# Patient Record
Sex: FEMALE | Race: BLACK OR AFRICAN AMERICAN | NOT HISPANIC OR LATINO | Employment: UNEMPLOYED | URBAN - METROPOLITAN AREA
[De-identification: names, ages, dates, MRNs, and addresses within clinical notes are randomized per-mention and may not be internally consistent; named-entity substitution may affect disease eponyms.]

---

## 2023-05-30 ENCOUNTER — TELEPHONE (OUTPATIENT)
Dept: ORTHOPEDICS | Facility: CLINIC | Age: 53
End: 2023-05-30

## 2023-05-30 ENCOUNTER — APPOINTMENT (OUTPATIENT)
Dept: GENERAL RADIOLOGY | Facility: CLINIC | Age: 53
End: 2023-05-30
Attending: EMERGENCY MEDICINE
Payer: COMMERCIAL

## 2023-05-30 ENCOUNTER — HOSPITAL ENCOUNTER (EMERGENCY)
Facility: CLINIC | Age: 53
Discharge: HOME | End: 2023-05-30
Attending: EMERGENCY MEDICINE | Admitting: EMERGENCY MEDICINE
Payer: COMMERCIAL

## 2023-05-30 VITALS
TEMPERATURE: 98.3 F | SYSTOLIC BLOOD PRESSURE: 121 MMHG | HEART RATE: 61 BPM | DIASTOLIC BLOOD PRESSURE: 72 MMHG | BODY MASS INDEX: 38.57 KG/M2 | HEIGHT: 66 IN | OXYGEN SATURATION: 99 % | WEIGHT: 240 LBS | RESPIRATION RATE: 15 BRPM

## 2023-05-30 DIAGNOSIS — S99.192A CLOSED FRACTURE OF BASE OF FIFTH METATARSAL BONE OF LEFT FOOT AT METAPHYSEAL-DIAPHYSEAL JUNCTION, INITIAL ENCOUNTER: ICD-10-CM

## 2023-05-30 DIAGNOSIS — S82.62XA CLOSED FRACTURE OF DISTAL LATERAL MALLEOLUS OF LEFT FIBULA, INITIAL ENCOUNTER: ICD-10-CM

## 2023-05-30 PROCEDURE — 99284 EMERGENCY DEPT VISIT MOD MDM: CPT | Performed by: EMERGENCY MEDICINE

## 2023-05-30 PROCEDURE — 99284 EMERGENCY DEPT VISIT MOD MDM: CPT | Mod: 25 | Performed by: EMERGENCY MEDICINE

## 2023-05-30 PROCEDURE — 73630 X-RAY EXAM OF FOOT: CPT | Mod: LT

## 2023-05-30 PROCEDURE — 27786 TREATMENT OF ANKLE FRACTURE: CPT | Mod: LT | Performed by: EMERGENCY MEDICINE

## 2023-05-30 PROCEDURE — 28470 CLTX METATARSAL FX WO MNP EA: CPT | Mod: LT,59 | Performed by: EMERGENCY MEDICINE

## 2023-05-30 PROCEDURE — 73600 X-RAY EXAM OF ANKLE: CPT | Mod: 26 | Performed by: RADIOLOGY

## 2023-05-30 PROCEDURE — 28470 CLTX METATARSAL FX WO MNP EA: CPT | Mod: LT | Performed by: EMERGENCY MEDICINE

## 2023-05-30 PROCEDURE — 250N000013 HC RX MED GY IP 250 OP 250 PS 637: Performed by: EMERGENCY MEDICINE

## 2023-05-30 PROCEDURE — 73630 X-RAY EXAM OF FOOT: CPT | Mod: 26 | Performed by: RADIOLOGY

## 2023-05-30 PROCEDURE — 73600 X-RAY EXAM OF ANKLE: CPT | Mod: LT

## 2023-05-30 RX ORDER — OXYCODONE HYDROCHLORIDE 5 MG/1
5 TABLET ORAL EVERY 6 HOURS PRN
Qty: 6 TABLET | Refills: 0 | Status: SHIPPED | OUTPATIENT
Start: 2023-05-30 | End: 2023-06-02

## 2023-05-30 RX ORDER — IBUPROFEN 600 MG/1
600 TABLET, FILM COATED ORAL ONCE
Status: COMPLETED | OUTPATIENT
Start: 2023-05-30 | End: 2023-05-30

## 2023-05-30 RX ADMIN — IBUPROFEN 600 MG: 600 TABLET, FILM COATED ORAL at 02:05

## 2023-05-30 ASSESSMENT — ACTIVITIES OF DAILY LIVING (ADL): ADLS_ACUITY_SCORE: 35

## 2023-05-30 NOTE — DISCHARGE INSTRUCTIONS
Please make an appointment to follow up with Orthopedics Clinic (phone: [phone]) as soon as possible.    Keep your foot elevated, place ice over your foot and use Tylenol and ibuprofen as below    If Samira has discomfort from fever or other pain, she can have:  Acetaminophen (Tylenol) every 6 hours as needed. Her dose is:    2 regular strength tabs (650 mg)                                     (43.2+ kg/96+ lb)    NOTE: If your acetaminophen (Tylenol) came with a dropper marked with 0.4 and 0.8 ml, call us ([phone]) or check with your doctor about the dose before using it.     AND/OR      Ibuprofen (Advil, Motrin) every 6 hours as needed. His/her dose is:    2 regular strength tabs (400 mg)                                                                         (40-60 kg/ lb)

## 2023-05-30 NOTE — ED PROVIDER NOTES
"ED Provider Note  [facility]      History     Chief Complaint   Patient presents with     Ankle Pain     Per pt, \"was walking out of my daughter's house & I tripped on steps & fell onto my left ankle & it's hurt ever since.\" Occurred this past Friday. Rates pain 10/10. Pt took a friend's muscle relaxer before coming tonight.      HPI  Samira Duron is a 52 year old female who presents with left foot pain. The patient reports that she fell on stairs 5/26/23, landing on her leg and hurting her ankle. She notes taking a muscle relaxer from a friend for her pain, she is not sure what it was. She states that she has been walking on it with difficulty due to severe pain. She requests an ace wrap at this time.    Past Medical History  History reviewed. No pertinent past medical history.  History reviewed. No pertinent surgical history.  naloxone (NARCAN) 4 MG/0.1ML nasal spray  oxyCODONE (ROXICODONE) 5 MG tablet      No Known Allergies  Family History  History reviewed. No pertinent family history.  Social History   Social History     Tobacco Use     Smoking status: Never     Smokeless tobacco: Never   Substance Use Topics     Alcohol use: Yes     Comment: 1 beer a day     Drug use: Never         A medically appropriate review of systems was performed with pertinent positives and negatives noted in the HPI, and all other systems negative.    Physical Exam      Physical Exam  Physical Exam   Constitutional: oriented to person, place, and time. appears well-developed and well-nourished.   HENT:   Head: Normocephalic and atraumatic.   Neck: Normal range of motion.   Pulmonary/Chest: Effort normal. No respiratory distress.   Cardiac: No murmurs, rubs, gallops. RRR.  Abdominal: Abdomen soft, nontender, nondistended. No rebound tenderness.  MSK: Long bones without deformity or evidence of trauma through palpation at the base of the left ankle and the distal lateral malleolus, range of motion ankle " normal without swelling  Neurological: alert and oriented to person, place, and time.   Skin: Skin is warm and dry.   Psychiatric:  normal mood and affect.  behavior is normal. Thought content normal.       ED Course, Procedures, & Data      [facility]    Splint Application    Date/Time: 5/30/2023 3:59 AM    Performed by: Thai Askew MD  Authorized by: Thai Askew MD    Risks, benefits and alternatives discussed.      PRE-PROCEDURE DETAILS     Sensation:  Normal    PROCEDURE DETAILS     Laterality:  Left    Location:  Ankle    Ankle:  L ankle    Splint type:  Short leg    Supplies:  Ortho-Glass    POST PROCEDURE DETAILS     Pain:  Improved    Sensation:  Normal      PROCEDURE    Patient Tolerance:  Patient tolerated the procedure well with no immediate complications                Results for orders placed or performed during the hospital encounter of 05/30/23   Foot XR, G/E 3 views, left     Status: None    Narrative    EXAM: XR FOOT LEFT G/E 3 VIEWS  LOCATION: [facility]  DATE/TIME: 5/30/2023 2:30 AM CDT    INDICATION: Inverted ankle. Pain  COMPARISON: None.      Impression    IMPRESSION: Minimally displaced fracture base of the fifth metatarsal. No dislocation. Soft tissue calcification. Plantar calcaneal spur. Soft tissue edema.   XR Ankle Left 2 Views     Status: None    Narrative    EXAM: XR ANKLE LEFT 2 VIEWS  LOCATION: [facility]  DATE/TIME: 5/30/2023 2:35 AM CDT    INDICATION: inverted ankle  COMPARISON: Foot radiographs performed concurrently      Impression    IMPRESSION: Faint irregularity at the distal tip of the lateral malleolus on the oblique view could reflect a tiny, nondisplaced fracture. The ankle mortise is not widened. There is an acute fracture through the base of the fifth metatarsal.     Medications - No data to display  Labs Ordered  and Resulted from Time of ED Arrival to Time of ED Departure - No data to display  No orders to display          Critical care was not performed.     Medical Decision Making  The patient's presentation was of low complexity (an acute and uncomplicated illness or injury).    The patient's evaluation involved:  ordering and/or review of 2 test(s) in this encounter (see separate area of note for details)    The patient's management necessitated moderate risk (prescription drug management including medications given in the ED).      Assessment & Plan    MDM  Patient presenting with foot and ankle pain.  She is found to have a Askew fracture and a small lateral malleolus fracture.  She is placed in a short leg splint and given crutches.  Instructed to stay nonweightbearing.  She is given small course of oxycodone with Narcan.  Recommended NSAIDs and Tylenol before using this in addition to ice and elevation.  Orthopedics referral was made, she will return if she has any further concerns.    I have reviewed the nursing notes. I have reviewed the findings, diagnosis, plan and need for follow up with the patient.    New Prescriptions    No medications on file       Final diagnoses:   Closed fracture of base of fifth metatarsal bone of left foot at metaphyseal-diaphyseal junction, initial encounter   Closed fracture of distal lateral malleolus of left fibula, initial encounter     Scribe Disclosure:   I, JUNIOR GROSS, am serving as a scribe; to document services personally performed by No name on file -based on data collection and the provider's statements to me.     Provider Disclosure:  I agree with above History, Review of Systems, Physical exam and Plan.  I have reviewed the content of the documentation and have edited it as needed. I have personally performed the services documented here and the documentation accurately represents those services and the decisions I have made.      Electronically signed  by:      Thai Askew  [facility] EMERGENCY DEPARTMENT  5/30/2023     Thai Askew MD  05/30/23 0401

## 2023-05-30 NOTE — ED TRIAGE NOTES
"Pt presents to ED BIBA with c/o pain to left lateral ankle pain from tripping down her daughter's steps this past Friday & \"twisting my left ankle out\". Pt able to ambulate by limping on left foot. Rates 10/10 pain. Constant, aching, throbbing. Pt took a friend's muscle relaxer earlier tonight. No tylenol or ibuprofen taken. Afebrile. A&Ox4.      Triage Assessment     Row Name 05/30/23 0156       Triage Assessment (Adult)    Airway WDL WDL       Respiratory WDL    Respiratory WDL WDL       Skin Circulation/Temperature WDL    Skin Circulation/Temperature WDL WDL       Cardiac WDL    Cardiac WDL WDL       Peripheral/Neurovascular WDL    Peripheral Neurovascular WDL WDL       Cognitive/Neuro/Behavioral WDL    Cognitive/Neuro/Behavioral WDL WDL              "

## 2023-05-30 NOTE — TELEPHONE ENCOUNTER
Orthopedic/Sports Medicine Fracture Triage    Incoming call/or message from orders pager.    Fracture type: Foot.    The patient is in a  splint.    Date of injury 5/26/23.    Triaged by: Dr. Hood.    Determined to be managed\: sportsmedicine    Needs to be seen within 1 week.    Additional Comments/information: Encounter forwarded to clinic coordinators to schedule pt with sports.      Raad Mcdaniels, ATC           
Patient for colonoscopy. Risks, Benefits, Alternatives explained.

## 2023-06-01 DIAGNOSIS — M79.672 LEFT FOOT PAIN: Primary | ICD-10-CM

## 2023-06-01 NOTE — TELEPHONE ENCOUNTER
DIAGNOSIS: left foot fracture//ma /xr/ortho cons     APPOINTMENT DATE: 6/5/23   NOTES STATUS DETAILS   DISCHARGE REPORT from the ER Internal [facility] ED: 5/30/23   MEDICATION LIST Internal    XRAYS (IMAGES & REPORTS) Internal XR Left Foot: 5/30/23

## 2023-06-05 ENCOUNTER — ANCILLARY PROCEDURE (OUTPATIENT)
Dept: GENERAL RADIOLOGY | Facility: CLINIC | Age: 53
End: 2023-06-05
Attending: FAMILY MEDICINE
Payer: COMMERCIAL

## 2023-06-05 ENCOUNTER — PRE VISIT (OUTPATIENT)
Dept: ORTHOPEDICS | Facility: CLINIC | Age: 53
End: 2023-06-05

## 2023-06-05 ENCOUNTER — OFFICE VISIT (OUTPATIENT)
Dept: ORTHOPEDICS | Facility: CLINIC | Age: 53
End: 2023-06-05
Payer: COMMERCIAL

## 2023-06-05 DIAGNOSIS — M79.672 LEFT FOOT PAIN: ICD-10-CM

## 2023-06-05 DIAGNOSIS — S92.902A CLOSED FRACTURE OF LEFT FOOT, INITIAL ENCOUNTER: Primary | ICD-10-CM

## 2023-06-05 PROCEDURE — 73630 X-RAY EXAM OF FOOT: CPT | Mod: LT | Performed by: RADIOLOGY

## 2023-06-05 PROCEDURE — 99203 OFFICE O/P NEW LOW 30 MIN: CPT | Performed by: FAMILY MEDICINE

## 2023-06-05 NOTE — LETTER
6/5/2023      RE: Samira Duron  [address] [place] [address]     Dear Colleague,    Thank you for referring your patient, Samira Duron, to the [facility] [place]. Please see a copy of my visit note below.    Sports Medicine Clinic Visit    PCP: System, Provider Not In    Samira Duron is a 52 year old female who is seen  as self referral presenting with Left foot pain    Location of Pain: left lateral foot  Duration of Pain: 10 day(s)  Rating of Pain: 10/10  Pain is better with: D  Pain is worse with: walking, weight bearing    There were no vitals taken for this visit.    Left foot fx x 10 days    Patient reports falling on stairs 5/26/2023 at her daughter's house.  Reported to the emergency room 5/30/2023 with x-rays indicating a closed fracture of the base of the fifth metatarsal of the left foot.        Imaging studies below reviewed by me:  EXAM: XR FOOT LEFT G/E 3 VIEWS  LOCATION: [facility]  DATE/TIME: 5/30/2023 2:30 AM CDT     INDICATION: Inverted ankle. Pain  COMPARISON: None.                                                                      IMPRESSION: Minimally displaced fracture base of the fifth metatarsal. No dislocation. Soft tissue calcification. Plantar calcaneal spur. Soft tissue edema.        EXAM: XR ANKLE LEFT 2 VIEWS  LOCATION: [facility]  DATE/TIME: 5/30/2023 2:35 AM CDT     INDICATION: inverted ankle  COMPARISON: Foot radiographs performed concurrently                                                                      IMPRESSION: Faint irregularity at the distal tip of the lateral malleolus on the oblique view could reflect a tiny, nondisplaced fracture. The ankle mortise is not widened. There is an acute fracture through the base of the fifth metatarsal.                XR ANKLE LEFT 3VW OR MORE  4/19/2019  [facility]  [place]  Jennifer PARRA MD - 2019   Formatting of this note might be different from the original.   EXAMINATION: XR ANKLE LEFT 3VW OR MORE     HISTORY: lateral malleolar pain     COMPARISON: None.     FINDINGS:     No dislocation. The ankle mortise is intact.     Bone density and texture   are normal. There is diffuse edema.   Degenerative changes are noted in the ankle. Calcaneal enthesophytes are   present.         PMH:  HTN (hypertension)       Calculus of gallbladder without cholecystitis without obstruction 2016     Cocaine use 9/10/2019 Last Assessment & Plan: Cessation counseling   Diverticulitis of intestine without perforation or abscess without bleeding 2017     Diverticulitis of sigmoid colon 2016 Diverticulitis of sigmoid colon   Eosinophilia 2019     Nausea with vomiting 2014     Peripheral neuropathy 9/10/2019 Last Assessment & Plan: Trial gabapentin   Tobacco use 9/10/2019 Last Assessment & Plan: Cessation counseling   Urethral diverticulum 2013     Urinary tract infection 2013     Degenerative disc disease, lumbar 2020     Spinal stenosis 2020     DJD (degenerative joint disease), multiple sites 2020     LVH (left ventricular hypertrophy) 2020 By ECG criteria   Hypertension, secondary 2020     Flat foot 2020     Candidiasis of vulva and vagina 2020     Chlamydia trachomatis infection of lower genitourinary site 2020     Diarrhea 2020     Trichomoniasis 2020     NAFLD (nonalcoholic fatty liver disease) 2020        Surgical History    Surgical History  Surgery Date Site/Laterality Comments   HX TUBAL LIGATION          [place] POLYSOM 6/>YRS CPAP 4/> PARM            Active problem list:  There is no problem list on file for this patient.      FH:  Cancer Brother    Throat   Aneurysm Maternal Aunt    Brain   Diabetes Maternal Grandmother        Cancer Maternal Uncle        Cancer Mother    Breast; Status:         SH:  Social History     Socioeconomic History     Marital status: Single     Spouse name: Not on file     Number of children: Not on file     Years of education: Not on file     Highest education level: Not on file   Occupational History     Not on file   Tobacco Use     Smoking status: Never     Smokeless tobacco: Never   Vaping Use     Vaping status: Not on file   Substance and Sexual Activity     Alcohol use: Yes     Comment: 1 beer a day     Drug use: Never     Sexual activity: Not on file   Other Topics Concern     Not on file   Social History Narrative     Not on file     Social Determinants of Health     Financial Resource Strain: Not on file   Food Insecurity: Not on file   Transportation Needs: Not on file   Physical Activity: Not on file   Stress: Not on file   Social Connections: Not on file   Intimate Partner Violence: Not on file   Housing Stability: Not on file       MEDS:  See EMR, reviewed  ALL:  See EMR, reviewed    REVIEW OF SYSTEMS:  CONSTITUTIONAL:NEGATIVE for fever, chills, change in weight  INTEGUMENTARY/SKIN: NEGATIVE for worrisome rashes, moles or lesions  EYES: NEGATIVE for vision changes or irritation  ENT/MOUTH: NEGATIVE for ear, mouth and throat problems  RESP:NEGATIVE for significant cough or SOB  BREAST: NEGATIVE for masses, tenderness or discharge  CV: NEGATIVE for chest pain, palpitations or peripheral edema  GI: NEGATIVE for nausea, abdominal pain, heartburn, or change in bowel habits  :NEGATIVE for frequency, dysuria, or hematuria  :NEGATIVE for frequency, dysuria, or hematuria  NEURO: NEGATIVE for weakness, dizziness or paresthesias  ENDOCRINE: NEGATIVE for temperature intolerance, skin/hair changes  HEME/ALLERGY/IMMUNE: NEGATIVE for bleeding problems  PSYCHIATRIC: NEGATIVE for changes in mood or affect      Objective: There is swelling noted about the lateral ankle and foot.  Nontender at the Achilles tendon I do not palpate a defect in the Achilles tendon.  Nontender at  the peroneal or posterior tibial tendon.  Mildly tender at the lateral malleolus, nontender at the medial malleolus.  Nontender in the area of Lisfranc.  Tender along the proximal fifth metatarsal left foot.  Skin is intact.  Sensation is intact.  She will flex and extend against resistance.  No tenderness in the calf.  Appropriate conversation and affect.    Personally viewed with the patient and updated x-rays that shows a proximal fifth metatarsal fracture, that is more proximal than the area of   a Askew fracture.  No other fractures noted about the ankle or foot.    : Proximal fifth metatarsal fracture x10 days    Plan: CAM Walker boot provided.  She can take it off for sleep and she can take it off when resting for ankle range of motion.  She was provided with a compression wrap.  Tylenol for pain.  She has crutches available.  She can advance her weightbearing as tolerated over the next 2 weeks.  Follow-up x-ray in the next 10 days to 2 weeks.  Patient is originally from [place].  She is planning on being here for the next month.  She knows this fracture may take 4 to 8 weeks to heal.  She had no further questions.                    DME FITTING    Relevant Diagnosis: left 5th MT fx  Tall walking boot, medium was fit on patient's Left foot.     Person(s) involved in teaching:   Patient    Brace was applied in standard Manner:  Yes  Brace fit well:  Yes  Patient reports brace to fit comfortably:  Yes    Education:   Patient shown self application and removal of brace: Yes  Patient shown how to adjust brace fit, if necessary: Yes  Patient educated on billing and return policy: Yes  Patient confirmed understanding when and how to contact clinic with concerns: Yes    Titi Brasher ATC on 6/5/2023 at 5:24 PM          Again, thank you for allowing me to participate in the care of your patient.      Sincerely,    Sean Hood MD

## 2023-06-05 NOTE — PROGRESS NOTES
Sports Medicine Clinic Visit    PCP: System, Provider Not In    Samira Domi is a 52 year old female who is seen  as self referral presenting with Left foot pain    Location of Pain: left lateral foot  Duration of Pain: 10 day(s)  Rating of Pain: 10/10  Pain is better with: D  Pain is worse with: walking, weight bearing    There were no vitals taken for this visit.    Left foot fx x 10 days    Patient reports falling on stairs 5/26/2023 at her daughter's house.  Reported to the emergency room 5/30/2023 with x-rays indicating a closed fracture of the base of the fifth metatarsal of the left foot.        Imaging studies below reviewed by me:  EXAM: XR FOOT LEFT G/E 3 VIEWS  LOCATION: [facility]  DATE/TIME: 5/30/2023 2:30 AM CDT     INDICATION: Inverted ankle. Pain  COMPARISON: None.                                                                      IMPRESSION: Minimally displaced fracture base of the fifth metatarsal. No dislocation. Soft tissue calcification. Plantar calcaneal spur. Soft tissue edema.        EXAM: XR ANKLE LEFT 2 VIEWS  LOCATION: [facility]  DATE/TIME: 5/30/2023 2:35 AM CDT     INDICATION: inverted ankle  COMPARISON: Foot radiographs performed concurrently                                                                      IMPRESSION: Faint irregularity at the distal tip of the lateral malleolus on the oblique view could reflect a tiny, nondisplaced fracture. The ankle mortise is not widened. There is an acute fracture through the base of the fifth metatarsal.                XR ANKLE LEFT 3VW OR MORE  4/19/2019  [facility]  Jennifer Valentino MD - 04/19/2019   Formatting of this note might be different from the original.   EXAMINATION: XR ANKLE LEFT 3VW OR MORE     HISTORY: lateral malleolar pain     COMPARISON: None.     FINDINGS:     No dislocation. The ankle mortise is intact.      Bone density and texture   are normal. There is diffuse edema.   Degenerative changes are noted in the ankle. Calcaneal enthesophytes are   present.         PMH:  HTN (hypertension)       Calculus of gallbladder without cholecystitis without obstruction 2016     Cocaine use 9/10/2019 Last Assessment & Plan: Cessation counseling   Diverticulitis of intestine without perforation or abscess without bleeding 2017     Diverticulitis of sigmoid colon 2016 Diverticulitis of sigmoid colon   Eosinophilia 2019     Nausea with vomiting 2014     Peripheral neuropathy 9/10/2019 Last Assessment & Plan: Trial gabapentin   Tobacco use 9/10/2019 Last Assessment & Plan: Cessation counseling   Urethral diverticulum 2013     Urinary tract infection 2013     Degenerative disc disease, lumbar 2020     Spinal stenosis 2020     DJD (degenerative joint disease), multiple sites 2020     LVH (left ventricular hypertrophy) 2020 By ECG criteria   Hypertension, secondary 2020     Flat foot 2020     Candidiasis of vulva and vagina 2020     Chlamydia trachomatis infection of lower genitourinary site 2020     Diarrhea 2020     Trichomoniasis 2020     NAFLD (nonalcoholic fatty liver disease) 2020        Surgical History    Surgical History  Surgery Date Site/Laterality Comments   HX TUBAL LIGATION          [place] POLYSOM 6/>YRS CPAP 4/> PARM            Active problem list:  There is no problem list on file for this patient.      FH:  Cancer Brother    Throat   Aneurysm Maternal Aunt    Brain   Diabetes Maternal Grandmother        Cancer Maternal Uncle        Cancer Mother    Breast; Status:        SH:  Social History     Socioeconomic History     Marital status: Single     Spouse name: Not on file     Number of children: Not on file     Years of education: Not on file     Highest education level: Not on file   Occupational History     Not on file   Tobacco  Use     Smoking status: Never     Smokeless tobacco: Never   Vaping Use     Vaping status: Not on file   Substance and Sexual Activity     Alcohol use: Yes     Comment: 1 beer a day     Drug use: Never     Sexual activity: Not on file   Other Topics Concern     Not on file   Social History Narrative     Not on file     Social Determinants of Health     Financial Resource Strain: Not on file   Food Insecurity: Not on file   Transportation Needs: Not on file   Physical Activity: Not on file   Stress: Not on file   Social Connections: Not on file   Intimate Partner Violence: Not on file   Housing Stability: Not on file       MEDS:  See EMR, reviewed  ALL:  See EMR, reviewed    REVIEW OF SYSTEMS:  CONSTITUTIONAL:NEGATIVE for fever, chills, change in weight  INTEGUMENTARY/SKIN: NEGATIVE for worrisome rashes, moles or lesions  EYES: NEGATIVE for vision changes or irritation  ENT/MOUTH: NEGATIVE for ear, mouth and throat problems  RESP:NEGATIVE for significant cough or SOB  BREAST: NEGATIVE for masses, tenderness or discharge  CV: NEGATIVE for chest pain, palpitations or peripheral edema  GI: NEGATIVE for nausea, abdominal pain, heartburn, or change in bowel habits  :NEGATIVE for frequency, dysuria, or hematuria  :NEGATIVE for frequency, dysuria, or hematuria  NEURO: NEGATIVE for weakness, dizziness or paresthesias  ENDOCRINE: NEGATIVE for temperature intolerance, skin/hair changes  HEME/ALLERGY/IMMUNE: NEGATIVE for bleeding problems  PSYCHIATRIC: NEGATIVE for changes in mood or affect      Objective: There is swelling noted about the lateral ankle and foot.  Nontender at the Achilles tendon I do not palpate a defect in the Achilles tendon.  Nontender at the peroneal or posterior tibial tendon.  Mildly tender at the lateral malleolus, nontender at the medial malleolus.  Nontender in the area of Lisfranc.  Tender along the proximal fifth metatarsal left foot.  Skin is intact.  Sensation is intact.  She will flex and  extend against resistance.  No tenderness in the calf.  Appropriate conversation and affect.    Personally viewed with the patient and updated x-rays that shows a proximal fifth metatarsal fracture, that is more proximal than the area of   a Askew fracture.  No other fractures noted about the ankle or foot.    : Proximal fifth metatarsal fracture x10 days    Plan: CAM Walker boot provided.  She can take it off for sleep and she can take it off when resting for ankle range of motion.  She was provided with a compression wrap.  Tylenol for pain.  She has crutches available.  She can advance her weightbearing as tolerated over the next 2 weeks.  Follow-up x-ray in the next 10 days to 2 weeks.  Patient is originally from [place].  She is planning on being here for the next month.  She knows this fracture may take 4 to 8 weeks to heal.  She had no further questions.

## 2023-06-05 NOTE — PROGRESS NOTES
DME FITTING    Relevant Diagnosis: left 5th MT fx  Tall walking boot, medium was fit on patient's Left foot.     Person(s) involved in teaching:   Patient    Brace was applied in standard Manner:  Yes  Brace fit well:  Yes  Patient reports brace to fit comfortably:  Yes    Education:   Patient shown self application and removal of brace: Yes  Patient shown how to adjust brace fit, if necessary: Yes  Patient educated on billing and return policy: Yes  Patient confirmed understanding when and how to contact clinic with concerns: Yes    Titi Brasher ATC on 6/5/2023 at 5:24 PM

## 2023-06-12 DIAGNOSIS — S92.902A CLOSED FRACTURE OF LEFT FOOT, INITIAL ENCOUNTER: Primary | ICD-10-CM
